# Patient Record
Sex: MALE | Race: WHITE | NOT HISPANIC OR LATINO | URBAN - METROPOLITAN AREA
[De-identification: names, ages, dates, MRNs, and addresses within clinical notes are randomized per-mention and may not be internally consistent; named-entity substitution may affect disease eponyms.]

---

## 2019-11-27 ENCOUNTER — OFFICE (OUTPATIENT)
Dept: URBAN - METROPOLITAN AREA CLINIC 101 | Facility: CLINIC | Age: 24
End: 2019-11-27

## 2019-11-27 VITALS
WEIGHT: 176 LBS | SYSTOLIC BLOOD PRESSURE: 107 MMHG | HEART RATE: 67 BPM | TEMPERATURE: 98.1 F | HEIGHT: 68 IN | DIASTOLIC BLOOD PRESSURE: 69 MMHG

## 2019-11-27 DIAGNOSIS — K92.1 MELENA: ICD-10-CM

## 2019-11-27 DIAGNOSIS — R19.4 CHANGE IN BOWEL HABIT: ICD-10-CM

## 2019-11-27 DIAGNOSIS — R12 HEARTBURN: ICD-10-CM

## 2019-11-27 PROCEDURE — 99244 OFF/OP CNSLTJ NEW/EST MOD 40: CPT

## 2019-11-27 NOTE — SERVICEHPINOTES
CANDIDA STEEN   is a   24   year old male who is being seen in consultation at the request of   ROQUE GRANADOS   for heartburn. He states a few months ago woke up with heartburn, first time he experienced this. Took tums, Nexium which seemed to give him relief. Cut out gluten but symptoms have persisted although does believe this has made somewhat of a difference. Some right-sided abdominal pain as well, which seems to be related to activity (worsens with running). Heartburn does not seem to worsen with certain foods but is worse when lying supine. He denies n/v or dysphagia. He is not taking any medication for GERD currently. He is planning on getting back into the  and would like to avoid all medications. No regular NSAIDs. Quit smoking 2 years ago. Drinks 1-2 glasses of wine  5 days per week. Usually eats dinner 630, goes to bed 1030. Drinks 3 cups of coffee every morning.Dennys reports a slight change in BMs recently with more constipation. Never had an issue with constipation before. BMs are still daily, BSS type 4, but feeling incompletely evacuated. He has seen brbpr when wiping a few times, first time was a few months ago and seems to occur when he is more constipated. No family hx of GI cancer.  Denies fever or chills.FONT style="BACKGROUND-COLOR: #ffffcc" visited="true"BR/FONT

## 2019-12-17 ENCOUNTER — OFFICE (OUTPATIENT)
Dept: URBAN - METROPOLITAN AREA CLINIC 100 | Facility: CLINIC | Age: 24
End: 2019-12-17
Payer: COMMERCIAL

## 2019-12-17 ENCOUNTER — OFFICE (OUTPATIENT)
Dept: URBAN - METROPOLITAN AREA PATHOLOGY 17 | Facility: PATHOLOGY | Age: 24
End: 2019-12-17
Payer: COMMERCIAL

## 2019-12-17 VITALS
SYSTOLIC BLOOD PRESSURE: 105 MMHG | WEIGHT: 176 LBS | HEIGHT: 68 IN | HEART RATE: 62 BPM | OXYGEN SATURATION: 96 % | HEART RATE: 75 BPM | RESPIRATION RATE: 14 BRPM | RESPIRATION RATE: 16 BRPM | SYSTOLIC BLOOD PRESSURE: 123 MMHG | HEART RATE: 56 BPM | DIASTOLIC BLOOD PRESSURE: 55 MMHG | TEMPERATURE: 98.2 F | SYSTOLIC BLOOD PRESSURE: 137 MMHG | OXYGEN SATURATION: 97 % | OXYGEN SATURATION: 99 % | TEMPERATURE: 98.1 F | DIASTOLIC BLOOD PRESSURE: 60 MMHG | SYSTOLIC BLOOD PRESSURE: 113 MMHG | DIASTOLIC BLOOD PRESSURE: 64 MMHG | SYSTOLIC BLOOD PRESSURE: 108 MMHG | DIASTOLIC BLOOD PRESSURE: 57 MMHG | OXYGEN SATURATION: 98 %

## 2019-12-17 DIAGNOSIS — K20.0 EOSINOPHILIC ESOPHAGITIS: ICD-10-CM

## 2019-12-17 DIAGNOSIS — R12 HEARTBURN: ICD-10-CM

## 2019-12-17 PROBLEM — K20.9 ESOPHAGITIS, UNSPECIFIED: Status: ACTIVE | Noted: 2019-12-17

## 2019-12-17 PROBLEM — K21.0 GASTRO-ESOPHAGEAL REFLUX DISEASE WITH ESOPHAGITIS: Status: ACTIVE | Noted: 2019-12-17

## 2019-12-17 PROCEDURE — 88305 TISSUE EXAM BY PATHOLOGIST: CPT

## 2019-12-17 PROCEDURE — 88312 SPECIAL STAINS GROUP 1: CPT

## 2019-12-17 PROCEDURE — 43239 EGD BIOPSY SINGLE/MULTIPLE: CPT
